# Patient Record
Sex: MALE | Race: WHITE | NOT HISPANIC OR LATINO | Employment: FULL TIME | URBAN - METROPOLITAN AREA
[De-identification: names, ages, dates, MRNs, and addresses within clinical notes are randomized per-mention and may not be internally consistent; named-entity substitution may affect disease eponyms.]

---

## 2023-02-20 ENCOUNTER — APPOINTMENT (OUTPATIENT)
Dept: URGENT CARE | Facility: CLINIC | Age: 33
End: 2023-02-20

## 2023-02-20 ENCOUNTER — OFFICE VISIT (OUTPATIENT)
Dept: URGENT CARE | Facility: CLINIC | Age: 33
End: 2023-02-20

## 2023-02-20 VITALS
RESPIRATION RATE: 18 BRPM | TEMPERATURE: 98 F | DIASTOLIC BLOOD PRESSURE: 82 MMHG | SYSTOLIC BLOOD PRESSURE: 146 MMHG | BODY MASS INDEX: 21.26 KG/M2 | HEIGHT: 72 IN | WEIGHT: 157 LBS | OXYGEN SATURATION: 99 % | HEART RATE: 79 BPM

## 2023-02-20 DIAGNOSIS — H01.9 INFECTION OF EYELID: ICD-10-CM

## 2023-02-20 DIAGNOSIS — H00.014 HORDEOLUM EXTERNUM OF LEFT UPPER EYELID: Primary | ICD-10-CM

## 2023-02-20 RX ORDER — CIPROFLOXACIN HYDROCHLORIDE 3.5 MG/ML
1 SOLUTION/ DROPS TOPICAL EVERY 8 HOURS
Qty: 2.5 ML | Refills: 0 | Status: SHIPPED | OUTPATIENT
Start: 2023-02-20 | End: 2023-02-27

## 2023-02-20 RX ORDER — CLINDAMYCIN HYDROCHLORIDE 300 MG/1
300 CAPSULE ORAL 3 TIMES DAILY
Qty: 21 CAPSULE | Refills: 0 | Status: SHIPPED | OUTPATIENT
Start: 2023-02-20 | End: 2023-02-27

## 2023-02-20 NOTE — LETTER
February 20, 2023     Patient: Rufina Rivera   YOB: 1990   Date of Visit: 2/20/2023       To Whom It May Concern:     eMry Huerta was seen on 2/20/23  If you have any questions or concerns, please don't hesitate to call           Sincerely,        Anahy Sheth PA-C    CC: No Recipients

## 2023-02-20 NOTE — PATIENT INSTRUCTIONS
Use eyedrops or ointment in affected eyes as prescribed  Wash cloth to the area  Clean everything thoroughly  Take antibiotics as prescribed  Follow-up with PCP in the next 1-2 days for further evaluation and treatment  Go to the ED if any fevers, unable to stay hydrated, change in vision, headache, facial swelling, pain swelling redness around the eye, eye pain, pain with eye movements, abdominal pain, chest pain, shortness of breath, new or worsening symptoms or other concerning symptoms

## 2023-02-20 NOTE — PROGRESS NOTES
[facility]        NAME: Denys Ambrose is a 33 y o  male  : 1990    MRN: [id number]  DATE: 2023  TIME: 2:41 PM    Assessment and Plan   Hordeolum externum of left upper eyelid [H00 014]  1  Hordeolum externum of left upper eyelid  ciprofloxacin (CILOXAN) 0 3 % ophthalmic solution      2  Infection of eyelid  clindamycin (CLEOCIN) 300 MG capsule            Patient Instructions     Use eyedrops or ointment in affected eyes as prescribed  Wash cloth to the area  Clean everything thoroughly  Take antibiotics as prescribed  Follow-up with PCP in the next 1-2 days for further evaluation and treatment  Go to the ED if any fevers, unable to stay hydrated, change in vision, headache, facial swelling, pain swelling redness around the eye, eye pain, pain with eye movements, abdominal pain, chest pain, shortness of breath, new or worsening symptoms or other concerning symptoms  Chief Complaint     Chief Complaint   Patient presents with   • Eye Pain     Started  with a pimple on his left eye lid  He thought he could pop it yesterday  Nothing came out  Last night he put a warm compress on it  Today his eye lid is red and swollen  No pain  Took a Benadryl  History of Present Illness       60-year-old male presents for evaluation of left eyelid swelling and "pimple" since yesterday  States yesterday he noticed a pimple on his left eyelid underneath his eyebrow  States he thought he could pop it so he tried to pop it yesterday  Denies any drainage  States he put a warm compress on it  States today noticed some swelling to the left eyelid and some mild redness around the pimple  Tried some Benadryl to help with the swelling with some improvement  Denies any pain or itchiness  Denies any other redness, warmth or swelling to the face or around the eye  Denies any eye pain or pain with eye movements  Denies any vision changes    He denies any injury or trauma to the area   States does not wear glasses or contacts  Denies any history of styes  She denies any fevers, chills, headaches, nausea, vomiting or other complaints  Review of Systems   Review of Systems   Constitutional: Negative for chills, fatigue and fever  HENT: Negative for congestion, ear discharge, ear pain, facial swelling, postnasal drip, rhinorrhea, sore throat, trouble swallowing and voice change  Eyes: Negative for photophobia, pain, discharge, redness, itching and visual disturbance  Pimple to left eyelid  Redness and swelling to left eye lid  Respiratory: Negative for cough, shortness of breath and wheezing  Cardiovascular: Negative for chest pain and leg swelling  Gastrointestinal: Negative for abdominal pain, diarrhea, nausea and vomiting  Genitourinary: Negative for decreased urine volume  Musculoskeletal: Negative for back pain, joint swelling, neck pain and neck stiffness  Neurological: Negative for dizziness, seizures, syncope, weakness, numbness and headaches  All other systems reviewed and are negative  Current Medications       Current Outpatient Medications:   •  ciprofloxacin (CILOXAN) 0 3 % ophthalmic solution, Administer 1 drop into the left eye every 8 (eight) hours for 7 days, Disp: 2 5 mL, Rfl: 0  •  clindamycin (CLEOCIN) 300 MG capsule, Take 1 capsule (300 mg total) by mouth 3 (three) times a day for 7 days, Disp: 21 capsule, Rfl: 0    Current Allergies     Allergies as of 02/20/2023 - Reviewed 02/20/2023   Allergen Reaction Noted   • Penicillins Hives 02/20/2023            The following portions of the patient's history were reviewed and updated as appropriate: allergies, current medications, past family history, past medical history, past social history, past surgical history and problem list      No past medical history on file  No past surgical history on file  No family history on file  Medications have been verified          Objective /82   Pulse 79   Temp 98 °F (36 7 °C)   Resp 18   Ht 6' (1 829 m)   Wt 71 2 kg (157 lb)   SpO2 99%   BMI 21 29 kg/m²        Physical Exam     Physical Exam  Vitals and nursing note reviewed  Constitutional:       General: He is not in acute distress  Appearance: Normal appearance  He is well-developed  He is not ill-appearing or toxic-appearing  HENT:      Right Ear: Tympanic membrane normal       Left Ear: Tympanic membrane normal       Mouth/Throat:      Mouth: Mucous membranes are moist       Pharynx: Oropharynx is clear  Uvula midline  No oropharyngeal exudate, posterior oropharyngeal erythema or uvula swelling  Eyes:      General: Vision grossly intact  Left eye: Hordeolum present  Extraocular Movements: Extraocular movements intact  Conjunctiva/sclera: Conjunctivae normal       Pupils: Pupils are equal, round, and reactive to light  Comments: No nystagmus, no pain with EOMs  No surrounding facial/periorbital erythema, warmth, swelling or tenderness to palpation  Cardiovascular:      Rate and Rhythm: Normal rate and regular rhythm  Heart sounds: Normal heart sounds  Pulmonary:      Effort: Pulmonary effort is normal  No respiratory distress  Breath sounds: Normal breath sounds  No wheezing  Musculoskeletal:      Cervical back: Normal range of motion and neck supple  No rigidity  Skin:     Capillary Refill: Capillary refill takes less than 2 seconds  Neurological:      Mental Status: He is alert and oriented to person, place, and time     Psychiatric:         Behavior: Behavior normal